# Patient Record
Sex: FEMALE | URBAN - METROPOLITAN AREA
[De-identification: names, ages, dates, MRNs, and addresses within clinical notes are randomized per-mention and may not be internally consistent; named-entity substitution may affect disease eponyms.]

---

## 2020-09-02 ENCOUNTER — OFFICE VISIT (OUTPATIENT)
Dept: URBAN - METROPOLITAN AREA CLINIC 45 | Facility: CLINIC | Age: 30
End: 2020-09-02
Payer: COMMERCIAL

## 2020-09-02 DIAGNOSIS — B02.31 ZOSTER CONJUNCTIVITIS: Primary | ICD-10-CM

## 2020-09-02 PROCEDURE — 92002 INTRM OPH EXAM NEW PATIENT: CPT | Performed by: OPTOMETRIST

## 2020-09-02 ASSESSMENT — INTRAOCULAR PRESSURE
OD: 15
OS: 15

## 2020-09-02 NOTE — IMPRESSION/PLAN
Impression: Zoster conjunctivitis: B02.31. Plan: viroptic 5 x day od, finish Acyclovir, ice prn, AT prn.  See PCP for pain management

## 2020-09-15 ENCOUNTER — OFFICE VISIT (OUTPATIENT)
Dept: URBAN - METROPOLITAN AREA CLINIC 45 | Facility: CLINIC | Age: 30
End: 2020-09-15
Payer: COMMERCIAL

## 2020-09-15 PROCEDURE — 99213 OFFICE O/P EST LOW 20 MIN: CPT | Performed by: OPTOMETRIST

## 2020-09-15 RX ORDER — PREDNISOLONE ACETATE 10 MG/ML
1 % SUSPENSION/ DROPS OPHTHALMIC
Qty: 1 | Refills: 0 | Status: INACTIVE
Start: 2020-09-15 | End: 2020-10-14

## 2020-09-15 ASSESSMENT — INTRAOCULAR PRESSURE
OD: 15
OS: 15

## 2020-09-15 NOTE — IMPRESSION/PLAN
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 Plan: continue Viroptic 5 x day OD, start Pred acetate qid od. See PCP for pain management.
room air

## 2020-09-18 ENCOUNTER — OFFICE VISIT (OUTPATIENT)
Dept: URBAN - METROPOLITAN AREA CLINIC 45 | Facility: CLINIC | Age: 30
End: 2020-09-18
Payer: COMMERCIAL

## 2020-09-18 PROCEDURE — 99213 OFFICE O/P EST LOW 20 MIN: CPT | Performed by: OPTOMETRIST

## 2020-09-18 RX ORDER — ACYCLOVIR 800 MG/1
800 MG TABLET ORAL
Qty: 50 | Refills: 0 | Status: INACTIVE
Start: 2020-09-18 | End: 2020-09-27

## 2020-09-18 ASSESSMENT — INTRAOCULAR PRESSURE
OD: 13
OS: 12

## 2020-09-18 NOTE — IMPRESSION/PLAN
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 Plan: refill Acyclovir 5 x day for 10 dys PO, Pred acetate qid OD, Viroptic/trifluridine 5 x day OD, cold comps and see PCP for pain management.

## 2020-09-25 ENCOUNTER — OFFICE VISIT (OUTPATIENT)
Dept: URBAN - METROPOLITAN AREA CLINIC 45 | Facility: CLINIC | Age: 30
End: 2020-09-25
Payer: COMMERCIAL

## 2020-09-25 PROCEDURE — 99213 OFFICE O/P EST LOW 20 MIN: CPT | Performed by: OPTOMETRIST

## 2020-09-25 RX ORDER — ATROPINE SULFATE 10 MG/ML
1 % SOLUTION/ DROPS OPHTHALMIC
Qty: 5 | Refills: 0 | Status: INACTIVE
Start: 2020-09-25 | End: 2020-10-13

## 2020-09-25 ASSESSMENT — INTRAOCULAR PRESSURE
OD: 10
OS: 10

## 2020-09-25 NOTE — IMPRESSION/PLAN
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 Plan: Recommend pt to [facility] course, continue with medications prescribed by PCP and finish the Viroptic, Pred-acetate qid OD .  Pt to start Atropine to help with the pain and Light sensitivity

## 2020-10-13 ENCOUNTER — OFFICE VISIT (OUTPATIENT)
Dept: URBAN - METROPOLITAN AREA CLINIC 45 | Facility: CLINIC | Age: 30
End: 2020-10-13
Payer: COMMERCIAL

## 2020-10-13 PROCEDURE — 99213 OFFICE O/P EST LOW 20 MIN: CPT | Performed by: OPTOMETRIST

## 2020-10-13 ASSESSMENT — INTRAOCULAR PRESSURE
OD: 13
OS: 15

## 2020-10-13 NOTE — IMPRESSION/PLAN
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 Plan: improved, pt finished 2 rounds of Acyclovir, taper off pred acetate q dy until bottle empty, dc atropine, artificial tears prn.  Pain management with PCP

## 2020-10-28 ENCOUNTER — OFFICE VISIT (OUTPATIENT)
Dept: URBAN - METROPOLITAN AREA CLINIC 45 | Facility: CLINIC | Age: 30
End: 2020-10-28
Payer: COMMERCIAL

## 2020-10-28 DIAGNOSIS — B02.32 ZOSTER IRIDOCYCLITIS: Primary | ICD-10-CM

## 2020-10-28 PROCEDURE — 99213 OFFICE O/P EST LOW 20 MIN: CPT | Performed by: OPTOMETRIST

## 2020-10-28 ASSESSMENT — INTRAOCULAR PRESSURE
OD: 5
OS: 5

## 2020-10-28 NOTE — IMPRESSION/PLAN
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  Plan: resolved, artificial tears prn Reason for call:  Patient reporting a symptom    Symptom or request: sore throat, fever, stuffy nose, headache    Duration (how long have symptoms been present): 5 days    Have you been treated for this before? Yes    Additional comments: no travel, she has been exposed to people who have traveled    Phone Number patient can be reached at:  Home number on file [phone] (home)    Best Time:  any    Can we leave a detailed message on this number:  YES    Call taken on 3/12/2020 at 12:24 PM by Nathaly Nichols